# Patient Record
(demographics unavailable — no encounter records)

---

## 2025-02-06 NOTE — ASSESSMENT
[FreeTextEntry1] : .Mr. SIDDIQUI 68 year He is evaluated for kidney transplantation. Pre transplant/ESRD: Patient will benefit from renal allotransplantation once confirmed to be a candidate after full evaluation that includes review of reports of risk evaluation noted below. Patient  is an acceptable candidate clinically with the following risks needing full evaluation before transplant candidacy can be confirmed: Cardiology eval probably will need cath to assess aortic valve disease. Will refer to Dr. Burnett. d/w coordinator.  Cardiology clearance prior to transplant, update work up as per protocol Cardiovascular, cancer screening, Infectious disease screening, Vascular/urinary tract anatomy, Psychosocial factors. Hypertension: Discussed implications. Continue follow up with primary physicians. Cardiac risk:  will get further evaluation; echo, stress test; Reviewed cardiovascular risk evaluation process Cancer screening:  Reviewed for any h/o neoplastic diseases. Appropriate cancer screening include: PSA Colon fan screening. ID: Serology for acute and chronic viral infections/immunity/screening for latent TB  Imaging: Renal/abdominal /chest /Iliac/ carotids imaging  Consults: Nutrition, social work, cardiology, Transplant surgery. Reviewed factors affecting survival and morbidity while on wait list and reviewed joss-operative and long-term risk factors affecting outcome in kidney transplantation. Details of transplant surgery, immunosuppression and its complications and benefits of live donor transplantation as well as variability in wait times across regions and multiple listing were discussed. KDPI >85% and PHS high risk criteria donors were discussed. Discussed factors affecting morbidity and mortality while on hemodialysis. Current outcome for [facility] kidney transplant program and SRTR data were discussed. He has informative educational video and power point presentation regarding details of kidney transplantation at this center. Patient has potential live donor ( none ) at present.  Will proceed with completing/ updating work up and listing for transplant/ live donor transplant once work up is reviewed and found to be ok. Copy of Consult/Note sent to referring nephrologist/Primary provider. Any work up obtained for transplant evaluation to be sent to patient's team of care providers as appropriate.

## 2025-02-06 NOTE — PHYSICAL EXAM
[General Appearance - Alert] : alert [General Appearance - In No Acute Distress] : in no acute distress [Sclera] : the sclera and conjunctiva were normal [PERRL With Normal Accommodation] : pupils were equal in size, round, and reactive to light [Extraocular Movements] : extraocular movements were intact [Outer Ear] : the ears and nose were normal in appearance [Jugular Venous Distention Increased] : there was no jugular-venous distention [Auscultation Breath Sounds / Voice Sounds] : lungs were clear to auscultation bilaterally [Heart Sounds Gallop] : no gallops [Heart Sounds Pericardial Friction Rub] : no pericardial rub [FreeTextEntry1] : Systolic murmur++ [Edema] : there was no peripheral edema [Bowel Sounds] : normal bowel sounds [Abdomen Soft] : soft [Abdomen Tenderness] : non-tender [Cervical Lymph Nodes Enlarged Posterior Bilaterally] : posterior cervical [Cervical Lymph Nodes Enlarged Anterior Bilaterally] : anterior cervical [Abnormal Walk] : normal gait [___ (cm) Fistula] : [unfilled] (cm) fistula [] : no rash [No Focal Deficits] : no focal deficits [Oriented To Time, Place, And Person] : oriented to person, place, and time [Impaired Insight] : insight and judgment were intact [Affect] : the affect was normal

## 2025-02-06 NOTE — HISTORY OF PRESENT ILLNESS
[FreeTextEntry1] : 68  years old male, born in [facility] living in [place]  Patient has known CKD (), ESRD (), Recvd transplant at [facility] (, right has been removed) lasted 3 years ; back on dialysis in  second transplant ( at [facility] c[facility] Left still in place) back on dialysis ()  on follow up with Dr.Paul Calle is here for pre kidney transplant evaluation. Previously followed by Dr. Harris Momin. Kidney disease is known to be from: HTN  He can walk a mile, works as  self employed. Drives. No SOB at rest, climbs stairs. No recent hospitalizations/transfusions/surgery/open wounds/infections/neoplasia since evaluation.   DIALYSIS Hx: [facility]   Patient is accompanied by spouse of 15 years, retd Angelina PEPPER today.  MEDICAL Hx:  Patient has no known DM  ; HTN (age 25). No H/o Hyperlipidemia/ Gout History of CAD/ CABG () Has moderate aortic stenosis, seen Dr Burnett, likely to need TAVR at some point. PAD has right leg arterial stent No known h/o kidney stone/ Prostatism/urinary obstruction/ ureter stents/hematuria. Has had Transfusions Urine out put: none No h/o Sleep apnea. Has h/o Para Thyroid disease.   No h/o NSAID/pain medication use. No major weight loss/weight loss surgeries Has no h/o Pneumonia / UTI/ known h/o tuberculosis or hepatitis/active infections/open wounds. No known h/o active CVA/DVT/bleeding/falls/seizures/psych issues/neoplasia  COVID/COVID vaccination:  Most recent hospitalization/for:  SURGICAL Hx: CABG () Left arm AVF Right kidney transplant (kidney has been removed) Left kidney transplant (Still in place)  PERSONAL/FAMILY Hx: Non smoker. Former, Qiut 30 years prior. occassional smoking Family:  Lives with: girlfriend Care giver post op: Parents are .   Siblings- 3 sisters and two brothers. Children: One son, 30 years old Healthy.  No family history of kidney disease Independent for ADL Able to walk ten blocks, can climb stairs without difficulty. Assist devices: none Driving: Yes ROS: Has h/o shortness of breath on exertion.  Vision:Ok Hearing:Ok Functional/employment status: HVAC tech. Self employed. Girlfriend worked as nurse's aid at [employer]   Potential Live donors:none  Prior Studies: Cardiology: Dr Ramo Ann Cancer Screen: Had colonoscopy in   Primary MD: Harris Momin Nephrologist: Riccardo Calle  Prior Transplants: two transplant Prior Transplant evaluation: Allergies: NKDA Medications: No longer on Eliquis Praluent 150 mg/d Ezetimibe 10/d Famotidine 20 mg/d Atorvastatin 80/d- no longer on Cinacalcet 30/d Metoprolol succinate 100 mg/d. Sevelamer 800 with meals

## 2025-04-17 NOTE — PHYSICAL EXAM
[Well Developed] : well developed [Well Nourished] : well nourished [No Acute Distress] : no acute distress [Normal Conjunctiva] : normal conjunctiva [Normal Venous Pressure] : normal venous pressure [No Carotid Bruit] : no carotid bruit [Normal S1, S2] : normal S1, S2 [No Rub] : no rub [No Gallop] : no gallop [Clear Lung Fields] : clear lung fields [Good Air Entry] : good air entry [No Respiratory Distress] : no respiratory distress  [Soft] : abdomen soft [Non Tender] : non-tender [No Masses/organomegaly] : no masses/organomegaly [Normal Bowel Sounds] : normal bowel sounds [Normal Gait] : normal gait [No Edema] : no edema [No Cyanosis] : no cyanosis [No Clubbing] : no clubbing [No Varicosities] : no varicosities [No Rash] : no rash [No Skin Lesions] : no skin lesions [Moves all extremities] : moves all extremities [No Focal Deficits] : no focal deficits [Normal Speech] : normal speech [Alert and Oriented] : alert and oriented [Normal memory] : normal memory [de-identified] : 3/6 MARIA LUZ RUSB/LUSB [de-identified] : left upper arm AV fistula

## 2025-04-17 NOTE — REASON FOR VISIT
[Other: ____] : [unfilled] [Other: _____] : [unfilled] [FreeTextEntry1] : April 2025 - Patient returns today for follow-up of his renal transplant list status. He continues to follow-up regularly with his cardiologist.  He has no symptoms of angina, syncope, or dyspnea on exertion. He continues to work in [employer].

## 2025-04-17 NOTE — HISTORY OF PRESENT ILLNESS
[FreeTextEntry1] : Juaquin Monreal presents today for preoperative cardiovascular evaluation prior to possible kidney transplant.   He is a 68 year old [place]oc[place] man with known cardiac risk factors of coronary artery disease (s/p CABG x 3 on 3/25/2022, LIMA to LAD, SVG to diag, RPDA), chronic hypertension, dyslipidemia, being a former smoker (quit 30 years ago, 1 PPW prior) and end stage renal disease on hemodialysis ( T-R-S, left upper arm AV fistula, [facility]). He has no history of diabetes mellitus or stroke. He does have a history of paroxysmal atrial fibrillation and GERD.   He was first diagnosed with renal disease in  and was on hemodialysis prior to his first DDRT in  at [facility]. The organ lasted for 3 years and failed. He went back on dialysis at that time and received a second DDRT at [facility] in .   For exercise he walks and jogs. Goes to the gym lifting weights and jogging.  Also uses a treadmill for one hour at a lower speed. No chest discomfort, shortness of breath, palpitations, lightheadedness or syncope.   His father is . He does not know his history. His mother is . She had a stroke, HTN. He has 3 sisters and 2 brothers. All are healthy.  He has one son. He is alive and well at age 30.  He is working [employer].   2024 - Patient returns today for follow-up in his usual state of health. He reports that during HD, he often gets palpitations. He does not take his beta-blocker prior to HD. Sometimes, he takes his metoprolol succinate 100 mg after HD. He has no symptoms of dizziness, syncope, or presyncope.  Nephrology: Riccardo Castañeda MD Cardiology: Juan Ann MD with [facility] [phone]

## 2025-04-17 NOTE — CARDIOLOGY SUMMARY
[de-identified] : 9/20/2023 - Sinus rhythm with long first degree AV delay Andres 400 msec. RSR' in V1. Fractionated QRS in lead II. Poor R-wave progression.  1/29/2024 - Sinus rhythm with long first degree AV delay, Andres 400 msec and second degree AV block, Mobitz I 4/17/2025 - atrial flutter with 4:1 AV block, borderline low voltage ECG [de-identified] : 10/30/2023 exercise nuclear stress test - 6 minutes and 31 seconds of Rachid protocol (7 METS), 86% MPHR, inferolateral and anterolateral defects that are partially reversible, concerning for ischemia, TID 1.23, LVEF 76% [de-identified] : 10/30/2023 TTE - severely dilated LA, moderate AS, mild-moderate pulmonary pressures, hyperdynamic LV systolic function, LVEF 70-75% 4/15/2024 TTE - septum 1.4 cm, PWT 1.4 cm, severely dilated LA, moderate-severe AS (MEENAKSHI 0.83 sqcm by continuity), LVEF 75% [de-identified] : 3/18/2022 with Vladimir Roth MD at [facility] - three vessel coronary artery disease including prox-LAD, D1, and RCA, with significant gradient across aortic valve [de-identified] : 3/25/2022 with Darryl Hudson MD at [facility] - CABG X 3 (LIMA to LAD, SVG to Diag, RPDA)

## 2025-04-17 NOTE — DISCUSSION/SUMMARY
[EKG obtained to assist in diagnosis and management of assessed problem(s)] : EKG obtained to assist in diagnosis and management of assessed problem(s) [FreeTextEntry1] : He is a 68 year old who presents today for preoperative cardiovascular evaluation prior to possible kidney transplant with cardiac risk factors as above.  For a patient with CAD status post CABG, continue high intensity statin.  Aspirin was stopped by primary cardiologist. Continue Eliquis 5 mg BID, metoprolol for patient with history of PAF.   Patient's recent TTE and nuclear stress test reviewed. In light of LVH with low voltage and symptoms of compressive neuropathy, will screen for amyloid cardiomyopathy.  Light chain studies sent today. TcPYP ordered. A diagnosis of TTR amyloid cardiomyopathy would not preclude renal transplant.

## 2025-05-30 NOTE — REVIEW OF SYSTEMS
[Feeling Fatigued] : feeling fatigued [Weight Loss (___ Lbs)] : [unfilled] ~Ulb weight loss [Heartburn] : heartburn [Dysphagia] : dysphagia [SOB] : shortness of breath [Dyspnea on exertion] : dyspnea during exertion [Chest Discomfort] : no chest discomfort [Palpitations] : no palpitations [Lower Ext Edema] : no extremity edema [Orthopnea] : no orthopnea [Change in Appetite] : change in appetite [Negative] : Genitourinary

## 2025-05-30 NOTE — CARDIOLOGY SUMMARY
[de-identified] : 5/13/25:  Left Ventricle: The left ventricular cavity is normal in size. Left ventricular wall thickness is mildly increased. Left ventricular systolic function is normal with an ejection fraction visually estimated at 55 to 60%. Mild left ventricular hypertrophy. There is increased LV mass and concentric hypertrophy. The left ventricular diastolic function is indeterminate. Analysis of left ventricular diastolic function and filling pressure is made challenging by the presence of mitral annular calcification. Left ventricular global longitudinal strain is -13.8 % is abnormal (> -16%). Images were acquired on a [employer] ultrasound system and processed on the ultrasound machine with a heart rate of 58 bpm and a blood pressure of 167/82 mmHg.  Right Ventricle: The right ventricular cavity is normal in size and right ventricular systolic function is reduced. Tricuspid annular plane systolic excursion (TAPSE) is 1.2 cm (normal >=1.7 cm).  Left Atrium: The left atrium is severely dilated with an indexed volume of 51.05 ml/m.  Right Atrium: The right atrium is severely dilated with an indexed volume of 55.83 ml/m.  Interatrial Septum: The interatrial septum appears intact.  Aortic Valve: The aortic valve appears trileaflet with reduced systolic excursion. There is calcification of the aortic valve leaflets. There is severe aortic stenosis. The peak transaortic velocity is 4.46 m/s, peak transaortic gradient is 79.6 mmHg and mean transaortic gradient is 45.8 mmHg with an LVOT/aortic valve VTI ratio of 0.25. The aortic valve area is estimated at 0.61 cm by the continuity equation and indexed at 0.32 cm/m. There is mild to moderate aortic regurgitation. AI VMax is 3.91 m/s. AI pressure half time is 442 msec. AI slope is 2.56 m/s.  Mitral Valve: There is moderate calcification of the mitral valve annulus. There is mild leaflet calcification. There is mild to moderate mitral valve stenosis. There is moderate mitral regurgitation.  Tricuspid Valve: Structurally normal tricuspid valve with normal leaflet excursion. There is moderate tricuspid regurgitation. Estimated pulmonary artery systolic pressure is 42 mmHg, consistent with mild to moderate pulmonary hypertension.

## 2025-05-30 NOTE — PHYSICAL EXAM
[Well Developed] : well developed [No Acute Distress] : no acute distress [Normal S1, S2] : normal S1, S2 [No Rub] : no rub [Murmur] : murmur [Clear Lung Fields] : clear lung fields [No Respiratory Distress] : no respiratory distress  [Soft] : abdomen soft [Normal Bowel Sounds] : normal bowel sounds [Normal Gait] : normal gait [No Edema] : no edema [Normal] : moves all extremities, no focal deficits, normal speech [Alert and Oriented] : alert and oriented [de-identified] : III/VI crescendo/decrescendo murmur heard loudest at the right upper sternal border radiating to the carotids  II/VI MARIA LUZ RUSB  [de-identified] : LUE AV fistula

## 2025-05-30 NOTE — CARDIOLOGY SUMMARY
[de-identified] : 5/13/25:  Left Ventricle: The left ventricular cavity is normal in size. Left ventricular wall thickness is mildly increased. Left ventricular systolic function is normal with an ejection fraction visually estimated at 55 to 60%. Mild left ventricular hypertrophy. There is increased LV mass and concentric hypertrophy. The left ventricular diastolic function is indeterminate. Analysis of left ventricular diastolic function and filling pressure is made challenging by the presence of mitral annular calcification. Left ventricular global longitudinal strain is -13.8 % is abnormal (> -16%). Images were acquired on a [employer] ultrasound system and processed on the ultrasound machine with a heart rate of 58 bpm and a blood pressure of 167/82 mmHg.  Right Ventricle: The right ventricular cavity is normal in size and right ventricular systolic function is reduced. Tricuspid annular plane systolic excursion (TAPSE) is 1.2 cm (normal >=1.7 cm).  Left Atrium: The left atrium is severely dilated with an indexed volume of 51.05 ml/m.  Right Atrium: The right atrium is severely dilated with an indexed volume of 55.83 ml/m.  Interatrial Septum: The interatrial septum appears intact.  Aortic Valve: The aortic valve appears trileaflet with reduced systolic excursion. There is calcification of the aortic valve leaflets. There is severe aortic stenosis. The peak transaortic velocity is 4.46 m/s, peak transaortic gradient is 79.6 mmHg and mean transaortic gradient is 45.8 mmHg with an LVOT/aortic valve VTI ratio of 0.25. The aortic valve area is estimated at 0.61 cm by the continuity equation and indexed at 0.32 cm/m. There is mild to moderate aortic regurgitation. AI VMax is 3.91 m/s. AI pressure half time is 442 msec. AI slope is 2.56 m/s.  Mitral Valve: There is moderate calcification of the mitral valve annulus. There is mild leaflet calcification. There is mild to moderate mitral valve stenosis. There is moderate mitral regurgitation.  Tricuspid Valve: Structurally normal tricuspid valve with normal leaflet excursion. There is moderate tricuspid regurgitation. Estimated pulmonary artery systolic pressure is 42 mmHg, consistent with mild to moderate pulmonary hypertension.

## 2025-05-30 NOTE — PHYSICAL EXAM
[Well Developed] : well developed [No Acute Distress] : no acute distress [Normal S1, S2] : normal S1, S2 [No Rub] : no rub [Murmur] : murmur [Clear Lung Fields] : clear lung fields [No Respiratory Distress] : no respiratory distress  [Soft] : abdomen soft [Normal Bowel Sounds] : normal bowel sounds [Normal Gait] : normal gait [No Edema] : no edema [Normal] : moves all extremities, no focal deficits, normal speech [Alert and Oriented] : alert and oriented [de-identified] : III/VI crescendo/decrescendo murmur heard loudest at the right upper sternal border radiating to the carotids  II/VI MARIA LUZ RUSB  [de-identified] : LUE AV fistula

## 2025-05-30 NOTE — HISTORY OF PRESENT ILLNESS
[FreeTextEntry1] : Mr. Monreal is a 68 year old male who is being referred to our clinic today by Dr. Carballo for evaluation of his Aortic Stenosis. He has a past medical history of ESRD with a failed Renal Transplant, paroxysmal AFib on Eliquis, HTN, and CABG in 2022 with Dr. Hudson. He was never a smoker and does not use ETOH. He undergoes HD on Monday/Wednesday and Friday.  Today he presents stating that he does not feel well, stating he has been having issues with his BP.  He reports that with activity, he will get fatigued, but does not really get VALVERDE, CP or pedal edema. He denies getting headaches when his BP is elevated, but he concerned about it. He has a difficult time falling asleep and does not remain asleep when he does. His appetite is poor, and he is not eating as much as he once did stating he has a hard time swallowing solid foods stating it feels dry and hard to go down but has no issues with liquids. He has not seen a GI. He has had no recent cardiac related admissions.   Assessment/Plan Severe aortic valve stenosis Nonfunctioning left AV fistula Esophageal dysmotility/inability to swallow solid food End-stage renal disease status post renal transplant 2009 right has been removed at [facility] last 3 years patient placed back on dialysis in 2012-second transplant 2013 at [facility] on left still in place patient reinitiated on dialysis in 2020 -- Being assessed for kidney transplantation CABG on 3/25-20 22 with LIMA to LAD, SVG to diagonal and SVG to right PDA Left AV fistula Atrial flutter  --The patient currently has multiple acute issues that are impairing his quality of life.  The patient notes that over the last few weeks he has had increasing difficulty swallowing solid foods.  He notes that due to this difficulty he has lost weight.  Able to swallow liquids without any issue.  He is currently receiving dialysis.  The patient and his wife reports that his left AV fistula is not functioning appropriately for which he is being further evaluated for possible intervention. -- Discussion was had with the patient and his family concerning his clinical presentation and findings on imaging studies. The patient has severe aortic valve stenosis.  Discussed what is severe aortic valve stenosis.  The associated signs and symptoms of severe aortic valve stenosis was gone over.  The natural pathophysiology of untreated severe aortic valve stenosis was reviewed.  The various treatment options were gone over including medical therapy, TAVR and SAVR.  The pros/cons and risk/benefits of the various treatment options were gone over.   --Due to the patient's age and comorbidities he is felt to be appropriate candidate to be worked up for TAVR.  The necessary workup prior to the TAVR procedure were discussed which includes heart CTA and coronary angiogram.  Indications and details of these procedures were reviewed.  Risks include but are not limited to infection, bleeding, arrhythmia, TIA/stroke, hemodynamic instability, vascular injury, need for urgent surgery and death.   --Indications and details for the TAVR procedure were discussed.  Benefits and risks were explained.  Risks include but are not limited to infection, bleeding, arrhythmia/pacemaker, TIA/stroke, hemodynamic instability, vascular injury, worsening renal insufficiency/need for dialysis, device infection, need for urgent surgery and death. --TTE on 5/13/2025 demonstrated there is increased LV mass and concentric hypertrophy. Left ventricular systolic function is normal with an ejection fraction visually estimated at 55 to 60%. Severe aortic stenosis with peak gradient 80 mmHg, mean gradient 46 mmHg, and MEENAKSHI 0.61 cm by the continuity equation. Left atrium is severely dilated. Reduced right ventricular systolic function. Estimated pulmonary artery systolic pressure is 42 mmHg, consistent with mild to moderate pulmonary hypertension. -- The patient has atrial flutter.  It is recommended the patient be evaluated by cardiology/EP team for further assessment of his atrial flutter and possible cardioversion/ablation.  Continue Eliquis 5 mg twice a day.  Signs and symptoms of bleeding were reviewed with the patient. --Continue amlodipine 10 mg daily and metoprolol succinate 100 mg daily. --Continue atorvastatin 80 mg daily.  The patient would like to proceed with TAVR evaluation/intervention.  All questions and concerns of the patient and his family (wife/Angelina former CNA who was present at time of visit and sister who was called on the phone) were addressed.  Due to patients GI symptoms plan for patient to go to the emergency department for further assessment.  Findings and plan were discussed with cardiology/Dr. Burnett and Dr. Carballo.   NYHA Classification: II

## 2025-05-30 NOTE — HISTORY OF PRESENT ILLNESS
[FreeTextEntry1] : Mr. Monreal is a 68 year old male who is being referred to our clinic today by Dr. Carballo for evaluation of his Aortic Stenosis. He has a past medical history of ESRD with a failed Renal Transplant, paroxysmal AFib on Eliquis, HTN, and CABG in 2022 with Dr. Hudson. He was never a smoker and does not use ETOH. He undergoes HD on Monday/Wednesday and Friday.  Today he presents stating that he does not feel well, stating he has been having issues with his BP.  He reports that with activity, he will get fatigued, but does not really get VALVERDE, CP or pedal edema. He denies getting headaches when his BP is elevated, but he concerned about it. He has a difficult time falling asleep and does not remain asleep when he does. His appetite is poor, and he is not eating as much as he once did stating he has a hard time swallowing solid foods stating it feels dry and hard to go down but has no issues with liquids. He has not seen a GI. He has had no recent cardiac related admissions.   Assessment/Plan Severe aortic valve stenosis Nonfunctioning left AV fistula Esophageal dysmotility/inability to swallow solid food End-stage renal disease status post renal transplant 2009 right has been removed at [facility] last 3 years patient placed back on dialysis in 2012-second transplant 2013 at [facility] on left still in place patient reinitiated on dialysis in 2020 -- Being assessed for kidney transplantation CABG on 3/25-20 22 with LIMA to LAD, SVG to diagonal and SVG to right PDA Left AV fistula Atrial flutter  --The patient currently has multiple acute issues that are impairing his quality of life.  The patient notes that over the last few weeks he has had increasing difficulty swallowing solid foods.  He notes that due to this difficulty he has lost weight.  Able to swallow liquids without any issue.  He is currently receiving dialysis.  The patient and his wife reports that his left AV fistula is not functioning appropriately for which he is being further evaluated for possible intervention. -- Discussion was had with the patient and his family concerning his clinical presentation and findings on imaging studies. The patient has severe aortic valve stenosis.  Discussed what is severe aortic valve stenosis.  The associated signs and symptoms of severe aortic valve stenosis was gone over.  The natural pathophysiology of untreated severe aortic valve stenosis was reviewed.  The various treatment options were gone over including medical therapy, TAVR and SAVR.  The pros/cons and risk/benefits of the various treatment options were gone over.   --Due to the patient's age and comorbidities he is felt to be appropriate candidate to be worked up for TAVR.  The necessary workup prior to the TAVR procedure were discussed which includes heart CTA and coronary angiogram.  Indications and details of these procedures were reviewed.  Risks include but are not limited to infection, bleeding, arrhythmia, TIA/stroke, hemodynamic instability, vascular injury, need for urgent surgery and death.   --Indications and details for the TAVR procedure were discussed.  Benefits and risks were explained.  Risks include but are not limited to infection, bleeding, arrhythmia/pacemaker, TIA/stroke, hemodynamic instability, vascular injury, worsening renal insufficiency/need for dialysis, device infection, need for urgent surgery and death. --TTE on 5/13/2025 demonstrated there is increased LV mass and concentric hypertrophy. Left ventricular systolic function is normal with an ejection fraction visually estimated at 55 to 60%. Severe aortic stenosis with peak gradient 80 mmHg, mean gradient 46 mmHg, and MEENAKSHI 0.61 cm by the continuity equation. Left atrium is severely dilated. Reduced right ventricular systolic function. Estimated pulmonary artery systolic pressure is 42 mmHg, consistent with mild to moderate pulmonary hypertension. -- The patient has atrial flutter.  It is recommended the patient be evaluated by cardiology/EP team for further assessment of his atrial flutter and possible cardioversion/ablation.  Continue Eliquis 5 mg twice a day.  Signs and symptoms of bleeding were reviewed with the patient. --Continue amlodipine 10 mg daily and metoprolol succinate 100 mg daily. --Continue atorvastatin 80 mg daily.  The patient would like to proceed with TAVR evaluation/intervention.  All questions and concerns of the patient and his family (wife/Angelina former CNA who was present at time of visit and sister who was called on the phone) were addressed.  Due to patients GI symptoms plan for patient to go to the emergency department for further assessment.  Findings and plan were discussed with cardiology/Dr. Burnett and Dr. Carballo.   NYHA Classification: II

## 2025-05-30 NOTE — REASON FOR VISIT
[Structural Heart and Valve Disease] : structural heart and valve disease [Spouse] : spouse [FreeTextEntry3] : Dr. Carballo